# Patient Record
Sex: MALE | Race: WHITE | Employment: FULL TIME | URBAN - METROPOLITAN AREA
[De-identification: names, ages, dates, MRNs, and addresses within clinical notes are randomized per-mention and may not be internally consistent; named-entity substitution may affect disease eponyms.]

---

## 2021-11-07 ENCOUNTER — HOSPITAL ENCOUNTER (EMERGENCY)
Age: 31
Discharge: HOME | End: 2021-11-07
Attending: EMERGENCY MEDICINE
Payer: COMMERCIAL

## 2021-11-07 ENCOUNTER — APPOINTMENT (OUTPATIENT)
Dept: GENERAL RADIOLOGY | Age: 31
End: 2021-11-07
Payer: COMMERCIAL

## 2021-11-07 VITALS
RESPIRATION RATE: 14 BRPM | HEART RATE: 75 BPM | OXYGEN SATURATION: 100 % | SYSTOLIC BLOOD PRESSURE: 155 MMHG | DIASTOLIC BLOOD PRESSURE: 98 MMHG

## 2021-11-07 DIAGNOSIS — R06.02 SHORTNESS OF BREATH: Primary | ICD-10-CM

## 2021-11-07 LAB
ANION GAP SERPL CALCULATED.3IONS-SCNC: 14 MMOL/L (ref 3–16)
BASE EXCESS VENOUS: 2.8 MMOL/L (ref -2–3)
BASOPHILS ABSOLUTE: 0 K/UL (ref 0–0.2)
BASOPHILS RELATIVE PERCENT: 0.9 %
BUN BLDV-MCNC: 14 MG/DL (ref 7–20)
CALCIUM SERPL-MCNC: 9.5 MG/DL (ref 8.3–10.6)
CARBOXYHEMOGLOBIN: 0.9 % (ref 0–1.5)
CHLORIDE BLD-SCNC: 99 MMOL/L (ref 99–110)
CO2: 24 MMOL/L (ref 21–32)
CREAT SERPL-MCNC: 0.9 MG/DL (ref 0.9–1.3)
D DIMER: <200 NG/ML DDU (ref 0–229)
EOSINOPHILS ABSOLUTE: 0.2 K/UL (ref 0–0.6)
EOSINOPHILS RELATIVE PERCENT: 6.1 %
GFR AFRICAN AMERICAN: >60
GFR NON-AFRICAN AMERICAN: >60
GLUCOSE BLD-MCNC: 107 MG/DL (ref 70–99)
HCO3 VENOUS: 28.6 MMOL/L (ref 24–28)
HCT VFR BLD CALC: 41 % (ref 40.5–52.5)
HEMOGLOBIN, VEN, REDUCED: 51.7 %
HEMOGLOBIN: 14.3 G/DL (ref 13.5–17.5)
LYMPHOCYTES ABSOLUTE: 1.6 K/UL (ref 1–5.1)
LYMPHOCYTES RELATIVE PERCENT: 44.7 %
MCH RBC QN AUTO: 32.5 PG (ref 26–34)
MCHC RBC AUTO-ENTMCNC: 34.8 G/DL (ref 31–36)
MCV RBC AUTO: 93.3 FL (ref 80–100)
METHEMOGLOBIN VENOUS: 0.4 % (ref 0–1.5)
MONOCYTES ABSOLUTE: 0.4 K/UL (ref 0–1.3)
MONOCYTES RELATIVE PERCENT: 11.5 %
NEUTROPHILS ABSOLUTE: 1.3 K/UL (ref 1.7–7.7)
NEUTROPHILS RELATIVE PERCENT: 36.8 %
O2 SAT, VEN: 48 %
PCO2, VEN: 47.5 MMHG (ref 41–51)
PDW BLD-RTO: 12.6 % (ref 12.4–15.4)
PH VENOUS: 7.39 (ref 7.35–7.45)
PLATELET # BLD: 323 K/UL (ref 135–450)
PMV BLD AUTO: 8.5 FL (ref 5–10.5)
PO2, VEN: <30 MMHG (ref 25–40)
POTASSIUM REFLEX MAGNESIUM: 4.6 MMOL/L (ref 3.5–5.1)
PRO-BNP: 18 PG/ML (ref 0–124)
RBC # BLD: 4.39 M/UL (ref 4.2–5.9)
SODIUM BLD-SCNC: 137 MMOL/L (ref 136–145)
TCO2 CALC VENOUS: 30 MMOL/L
TROPONIN: <0.01 NG/ML
WBC # BLD: 3.5 K/UL (ref 4–11)

## 2021-11-07 PROCEDURE — 85379 FIBRIN DEGRADATION QUANT: CPT

## 2021-11-07 PROCEDURE — 85025 COMPLETE CBC W/AUTO DIFF WBC: CPT

## 2021-11-07 PROCEDURE — 84484 ASSAY OF TROPONIN QUANT: CPT

## 2021-11-07 PROCEDURE — 83880 ASSAY OF NATRIURETIC PEPTIDE: CPT

## 2021-11-07 PROCEDURE — 80048 BASIC METABOLIC PNL TOTAL CA: CPT

## 2021-11-07 PROCEDURE — 99283 EMERGENCY DEPT VISIT LOW MDM: CPT

## 2021-11-07 PROCEDURE — 36415 COLL VENOUS BLD VENIPUNCTURE: CPT

## 2021-11-07 PROCEDURE — 71046 X-RAY EXAM CHEST 2 VIEWS: CPT

## 2021-11-07 PROCEDURE — 82803 BLOOD GASES ANY COMBINATION: CPT

## 2021-11-07 NOTE — ED TRIAGE NOTES
Pt states he was on the way to the grocery store when he started to feel like he was going to pass out. Pt states he was feeling fine earlier today, he had plans to go for a run. Denies any medical problems, no sob, cough.

## 2021-11-07 NOTE — ED NOTES
Pt discharged to home. Pt verbalized understanding of discharged instructions. Pt left ED ambulatory without incident.         Satish Arzate RN  11/07/21 8111

## 2021-11-07 NOTE — ED PROVIDER NOTES
[id number] RESIDENT NOTE       Date of evaluation: 11/7/2021    Chief Complaint     Syncope    History of Present Illness     Paula Adkins is a 27 y.o. male previously well who presents for evaluation of shortness of breath. The patient notes the he was driving to the grocery store approximately 1 hour prior to arrival when he had acute onset shortness of breath feeling like he cannot catch his breath with associated chest tightness and lightheadedness. He felt like he was going to pass out, however did not. He had to stop on the side of the road because he feared that he would be able to drive. He has never had this before. He notes baseline amount of coffee consumption this morning. Denies fevers, chills, congestion, sore throat, double vision, abdominal pain, constipation, diarrhea, dysuria, frequency, urgency, falls, rash, sensory deficits, weakness, headache. Other than stated above, no additional aggravating or alleviating factors are noted. Review of Systems     All other systems are negative except as mentioned in HPI. Past Medical, Surgical, Family, and Social History     He has no past medical history on file. He has a past surgical history that includes Appendectomy. His family history is not on file. He reports that he has never smoked. He has never used smokeless tobacco. He reports current alcohol use. He reports that he does not use drugs. Medications     Previous Medications    No medications on file       Allergies     He has No Known Allergies. Physical Exam     INITIAL VITALS: BP: (!) 155/98,  , Pulse: 75, Resp: 14, SpO2: 100 %   Physical Exam    General:  Well appearing. No acute distress  Eyes:  Pupils reactive. No discharge from eyes   ENT:  No discharge from nose. OP clear  Neck:  Supple, trachea midline  Pulmonary:   Non-labored breathing.  Breath sounds clear bilaterally  Cardiac:  Regular rate and rhythm. No murmurs  Abdomen:  Soft. Non-tender. Non-distended  Musculoskeletal:  No long bone deformity. No CVA tenderness  Vascular:  Extremities warm and perfused. Skin:  Dry, no rashes  Extremities:  No peripheral edema  Neuro:  Alert. Moves all four extremities to command. No focal deficit  Neuro:  Alert and oriented x 4. CN II-XII intact. 5/5 strength in all 4 extremities. Sensation grossly intact to light touch. Speech and mentation normal.  Gait narrow and stable    DiagnosticResults     EKG   Interpreted in conjunction with emergencydepartment physician No att. providers found  Rhythm: normal sinus   Rate: normal  Axis: normal  Ectopy: none  Conduction: normal  ST Segments: no acute change  T Waves:no acute change  Q Waves: none  Clinical Impression: no acute changes  Comparison:  none    RADIOLOGY:  XR CHEST (2 VW)   Final Result   1. No acute cardiopulmonary abnormalities.              LABS:   Results for orders placed or performed during the hospital encounter of 11/07/21   CBC Auto Differential   Result Value Ref Range    WBC 3.5 (L) 4.0 - 11.0 K/uL    RBC 4.39 4.20 - 5.90 M/uL    Hemoglobin 14.3 13.5 - 17.5 g/dL    Hematocrit 41.0 40.5 - 52.5 %    MCV 93.3 80.0 - 100.0 fL    MCH 32.5 26.0 - 34.0 pg    MCHC 34.8 31.0 - 36.0 g/dL    RDW 12.6 12.4 - 15.4 %    Platelets 041 074 - 857 K/uL    MPV 8.5 5.0 - 10.5 fL    Neutrophils % 36.8 %    Lymphocytes % 44.7 %    Monocytes % 11.5 %    Eosinophils % 6.1 %    Basophils % 0.9 %    Neutrophils Absolute 1.3 (L) 1.7 - 7.7 K/uL    Lymphocytes Absolute 1.6 1.0 - 5.1 K/uL    Monocytes Absolute 0.4 0.0 - 1.3 K/uL    Eosinophils Absolute 0.2 0.0 - 0.6 K/uL    Basophils Absolute 0.0 0.0 - 0.2 K/uL   Basic Metabolic Panel w/ Reflex to MG   Result Value Ref Range    Sodium 137 136 - 145 mmol/L    Potassium reflex Magnesium 4.6 3.5 - 5.1 mmol/L    Chloride 99 99 - 110 mmol/L    CO2 24 21 - 32 mmol/L    Anion Gap 14 3 - 16    Glucose 107 (H) 70 - 99 mg/dL    BUN 14 7 - 20 mg/dL    CREATININE 0.9 0.9 - 1.3 mg/dL    GFR Non-African American >60 >60    GFR African American >60 >60    Calcium 9.5 8.3 - 10.6 mg/dL   Blood Gas, Venous   Result Value Ref Range    pH, Juan J 7.389 7.350 - 7.450    pCO2, Juan J 47.5 41.0 - 51.0 mmHg    pO2, Juan J <30.0 25.0 - 40.0 mmHg    HCO3, Venous 28.6 (H) 24.0 - 28.0 mmol/L    Base Excess, Juan J 2.8 -2.0 - 3.0 mmol/L    O2 Sat, Juan J 48 Not established %    Carboxyhemoglobin 0.9 0.0 - 1.5 %    MetHgb, Juan J 0.4 0.0 - 1.5 %    TC02 (Calc), Juan J 30 mmol/L    Hemoglobin, Juan J, Reduced 51.70 %   Troponin   Result Value Ref Range    Troponin <0.01 <0.01 ng/mL   Brain Natriuretic Peptide   Result Value Ref Range    Pro-BNP 18 0 - 124 pg/mL   D-dimer, quantitative (Lab)   Result Value Ref Range    D-Dimer, Quant <200 0 - 229 ng/mL DDU       ED BEDSIDE ULTRASOUND:      RECENT VITALS:  BP: (!) 155/98,  , Pulse: 75,Resp: 14, SpO2: 100 %     Procedures         ED Course     Nursing Notes, Past Medical Hx, Past Surgical Hx, Social Hx, Allergies, and Family Hx were reviewed. The patient was given the following medications:  No orders of the defined types were placed in this encounter. CONSULTS:  None    MEDICAL DECISION MAKING / ASSESSMENT / PLAN     Enoc Turner is a 27 y.o. male with a history and presentation as described above in HPI. The patient was evaluated by myself and the ED Attending Physician, Dr. Moriah Monzon. All management and disposition plans were discussed and agreed upon. Upon presentation, the patient was well-appearing, afebrile and hemodynamically stable. Based on our evaluation, this is a patient that is presenting with low risk presyncope. High risk factors such as prior coronary artery disease, congestive heart failure, advanced age, chest pain or palpitations are all absent. There was no history or exam findings to suggest a neurologic cause of syncope.   Additionally, electrolytes, kidney function, and CBC were within normal limits and the patient had no historical points concerning for GI bleeding. No fever or nidus of infection apparent on exam as well. EKG was within normal limits with troponin reassuring. PE was considered but is less likely with VS and negative D-dimer. The patient's story correlates best with vasovagal syncope, and given the low risk nature of the presentation, patient will be discharged home with appropriate follow up. Medications received during this ED visit:  Medications - No data to display    At this time, the patient was deemed appropriate for discharge. My customary discharge instructions, including strict return precautions for new or worsening symptoms concerning to the patient, were provided. All of patient's questions were answered satisfactorily, and was subsequently sent home in stable condition. This patient was also evaluated by the attending physician. All care plans were discussed and agreed upon. Clinical Impression     1. Shortness of breath        Disposition     PATIENT REFERRED TO:  Marilyn Forde MD  [facility] [address]  [phone]    Schedule an appointment as soon as possible for a visit         DISCHARGE MEDICATIONS:  New Prescriptions    No medications on file       DISPOSITION      1. The patient is to be discharged home in stable/improved condition. 2. Workup, treatment and diagnosis were discussed with the patient and/or family members; the patient agrees to the plan and all questions were addressed and answered. 3. The patient is instructed to return to the emergency department should their symptoms worsen or any concern the patient believes warrants acute physician evaluation.        Jessika Naik MD  Resident  11/07/21 4124

## 2021-11-07 NOTE — ED PROVIDER NOTES
ED Attending Attestation Note     Date of evaluation: 11/7/2021    This patient was seen by the resident. I have seen and examined the patient, agree with the workup, evaluation, management and diagnosis. The care plan has been discussed. I have reviewed the ECG and concur with the resident's interpretation. My assessment reveals nontender chest and abdomen, clear lungs and nonlabored breathing.      Martina Vasques MD  11/07/21 1100

## 2021-11-17 LAB
EKG ATRIAL RATE: 67 BPM
EKG DIAGNOSIS: NORMAL
EKG P AXIS: 37 DEGREES
EKG P-R INTERVAL: 132 MS
EKG Q-T INTERVAL: 410 MS
EKG QRS DURATION: 94 MS
EKG QTC CALCULATION (BAZETT): 433 MS
EKG R AXIS: 31 DEGREES
EKG T AXIS: 11 DEGREES
EKG VENTRICULAR RATE: 67 BPM